# Patient Record
(demographics unavailable — no encounter records)

---

## 2025-03-26 NOTE — PHYSICAL EXAM
[Well Nourished] : well nourished [No Acute Distress] : no acute distress [Frail] : frail [Normal Conjunctiva] : normal conjunctiva [Normal Venous Pressure] : normal venous pressure [No Carotid Bruit] : no carotid bruit [Normal S1, S2] : normal S1, S2 [No Murmur] : no murmur [No Rub] : no rub [No Gallop] : no gallop [Clear Lung Fields] : clear lung fields [Good Air Entry] : good air entry [No Respiratory Distress] : no respiratory distress  [Soft] : abdomen soft [Non Tender] : non-tender [No Masses/organomegaly] : no masses/organomegaly [Normal Bowel Sounds] : normal bowel sounds [Normal Gait] : normal gait [No Edema] : no edema [No Cyanosis] : no cyanosis [No Clubbing] : no clubbing [Venous varicosities] : venous varicosities [No Rash] : no rash [No Skin Lesions] : no skin lesions [Moves all extremities] : moves all extremities [No Focal Deficits] : no focal deficits [Normal Speech] : normal speech [Alert and Oriented] : alert and oriented [Normal memory] : normal memory [de-identified] : Thin, alert and oriented 65 year old WM appearing older than his stated age [de-identified] : Median sternotomy scar is healed

## 2025-03-26 NOTE — DISCUSSION/SUMMARY
[FreeTextEntry1] : Maintain ASA 81 mg QD.  He is off  clopidogrel  Maintain present medications Start a low fat, low cholesterol diet The results of his cardiac testing were provided to him previously. He was counseled on his lab results. His LDL is still elevated and he wishes not to increase his atorvastatin to 80 mg at this time. He was advised on this and following a better diet. He was also counseled to improve his HDL through more exercise. He is on Rituximab infusion for his RA and believes that he will be able to exercise more often. he also had undergone recent hip surgery and is recovering. Repeat a lipid and hepatic panel CBC BMP prior to his next office visit. EKG: NSR, rate of 69 bpm, prior septal MI RV in 6 months [EKG obtained to assist in diagnosis and management of assessed problem(s)] : EKG obtained to assist in diagnosis and management of assessed problem(s)

## 2025-03-26 NOTE — REVIEW OF SYSTEMS
[Joint Pain] : joint pain [Joint Swelling] : joint swelling [Joint Stiffness] : joint stiffness [Negative] : Heme/Lymph [FreeTextEntry9] : arthritic changes on digits

## 2025-03-26 NOTE — HISTORY OF PRESENT ILLNESS
[FreeTextEntry1] : Rheumatoid arthritis\par  He denies a prior history of MI,angina, CHF, arrhythmia, TIA, CVA, syncope, DM, Hypertension, obesity or cigarette smoking.\par  He has a familial history of heart disease, his mother  of an enlarged heart and CHF at 63 yrs in [place].\par  He denies any prior cardiac testing apart from EKG's.\par  Large granular lymphocytic leukemia, patient is on cyclosporine therapy.\par  Lung nodules, hilar lymphadenopathy\par  He denies any history of surgery or hospitalization\par  He does not exercise.\par  He works as a .

## 2025-03-26 NOTE — REASON FOR VISIT
[FreeTextEntry1] : Pleasant 65 year old Polish speaking WM who presents to the office for a follow up Cardiology consultation. Patient is here for follow up s/p 4v. CABG and to review his lab results.

## 2025-03-26 NOTE — ASSESSMENT
[FreeTextEntry1] : Abnormal 12 lead EKG suggestive of a prior anteroseptal MI  Abnormal treadmill stress test with ischemic EKG response  Rheumatoid arthritis  ASHD with coronary artery calcifications in all coronary vessels noted on CCTA with markedly elevated coronary calcium score. 2417 Agatston score corresponding to 98th %ile.  Diastolic dysfunction  LAE  CAD s/p 4 v. CABG with LIMA to LAD and SVG in sequential fashion to diag1,OM1, and OM2.